# Patient Record
Sex: MALE | Race: WHITE | NOT HISPANIC OR LATINO | Employment: FULL TIME | URBAN - METROPOLITAN AREA
[De-identification: names, ages, dates, MRNs, and addresses within clinical notes are randomized per-mention and may not be internally consistent; named-entity substitution may affect disease eponyms.]

---

## 2020-01-30 ENCOUNTER — OFFICE VISIT (OUTPATIENT)
Dept: FAMILY MEDICINE CLINIC | Facility: CLINIC | Age: 60
End: 2020-01-30

## 2020-01-30 VITALS
WEIGHT: 296 LBS | DIASTOLIC BLOOD PRESSURE: 86 MMHG | RESPIRATION RATE: 16 BRPM | SYSTOLIC BLOOD PRESSURE: 122 MMHG | HEART RATE: 74 BPM | OXYGEN SATURATION: 96 % | HEIGHT: 76 IN | BODY MASS INDEX: 36.04 KG/M2

## 2020-01-30 DIAGNOSIS — Z23 NEED FOR VACCINATION: ICD-10-CM

## 2020-01-30 DIAGNOSIS — E78.5 DYSLIPIDEMIA: ICD-10-CM

## 2020-01-30 DIAGNOSIS — Z00.00 ROUTINE HEALTH MAINTENANCE: Primary | ICD-10-CM

## 2020-01-30 DIAGNOSIS — Z12.11 SCREENING FOR COLON CANCER: ICD-10-CM

## 2020-01-30 DIAGNOSIS — Z13.1 SCREENING FOR DIABETES MELLITUS: ICD-10-CM

## 2020-01-30 DIAGNOSIS — L60.4 BEAU'S LINES OF NAILS: ICD-10-CM

## 2020-01-30 PROBLEM — E66.9 OBESITY (BMI 30-39.9): Status: ACTIVE | Noted: 2017-04-03

## 2020-01-30 PROBLEM — R00.2 PALPITATIONS: Status: ACTIVE | Noted: 2017-04-03

## 2020-01-30 PROBLEM — M51.379 DDD (DEGENERATIVE DISC DISEASE), LUMBOSACRAL: Status: ACTIVE | Noted: 2020-01-30

## 2020-01-30 PROBLEM — S22.49XA MULTIPLE RIB FRACTURES: Status: ACTIVE | Noted: 2017-08-24

## 2020-01-30 PROBLEM — I25.10 CAD (CORONARY ARTERY DISEASE): Status: ACTIVE | Noted: 2017-08-24

## 2020-01-30 PROBLEM — M51.37 DDD (DEGENERATIVE DISC DISEASE), LUMBOSACRAL: Status: ACTIVE | Noted: 2020-01-30

## 2020-01-30 PROBLEM — Z65.8 PSYCHOSOCIAL STRESSORS: Status: ACTIVE | Noted: 2017-03-21

## 2020-01-30 PROCEDURE — 90472 IMMUNIZATION ADMIN EACH ADD: CPT | Performed by: FAMILY MEDICINE

## 2020-01-30 PROCEDURE — 90686 IIV4 VACC NO PRSV 0.5 ML IM: CPT | Performed by: FAMILY MEDICINE

## 2020-01-30 PROCEDURE — 90471 IMMUNIZATION ADMIN: CPT | Performed by: FAMILY MEDICINE

## 2020-01-30 PROCEDURE — 90750 HZV VACC RECOMBINANT IM: CPT | Performed by: FAMILY MEDICINE

## 2020-01-30 PROCEDURE — 99386 PREV VISIT NEW AGE 40-64: CPT | Performed by: FAMILY MEDICINE

## 2020-01-30 NOTE — PROGRESS NOTES
Han Stiles is a 59 y.o. male. Pt is a new pt for the clinic and he is here to establish new PCP and have his nail check.     Chief Complaint   Patient presents with   • Nail Problem       HPI     Here to establish care. 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 Has been working hard to keep himself healthy. Would like to get off all meds. Had high cholesterol in the past, taking low dose atorvastatin. Planning to start a vegan diet beginning of February, will stop statin and would like to check labs sometime thereafter.     Weight has fluctuated over the years. Hoping that a new diet will help him keep the weight off and be more healthy. Hasn't had routine labs in some time. Due for flu shot, hasn't had shingles vaccine.     Only concern is for some horizontal ridges on his fingernails. Can't recall how long he's had them, was told by a friend that they might be due to vitamin deficiency. Doesn't recall any trauma specific to his nails though pretty sure he's broken all of his fingers at one time or another.     Needs colonoscopy. No family or personal history, no current bowel problems.     No family history of prostate issues.     The following portions of the patient's history were reviewed and updated as appropriate: allergies, current medications, past family history, past medical history, past social history, past surgical history and problem list.    Review of Systems   Constitutional: Negative for chills, fatigue, fever and unexpected weight change.   HENT: Negative for sore throat.    Respiratory: Negative for cough, shortness of breath and wheezing.    Cardiovascular: Negative for chest pain, palpitations and leg swelling.   Gastrointestinal: Negative for abdominal distention, abdominal pain, constipation, diarrhea, nausea and vomiting.    Genitourinary: Negative for dysuria.   Musculoskeletal: Negative for arthralgias and myalgias.   Skin: Negative for rash.   Neurological: Negative for dizziness.   Psychiatric/Behavioral: Negative for confusion.     I have reviewed and confirmed the ROS as documented by the MA. Varinder Knott MD    Objective  Vitals:    01/30/20 1612   BP: 122/86   Pulse: 74   Resp: 16   SpO2: 96%     Body mass index is 36.03 kg/m².    Physical Exam   Constitutional: He is oriented to person, place, and time. He appears well-developed and well-nourished. No distress.   Eyes: Pupils are equal, round, and reactive to light. EOM are normal.   Neck: Normal range of motion. Neck supple.   Cardiovascular: Normal rate, regular rhythm, normal heart sounds and intact distal pulses.   No murmur heard.  Pulmonary/Chest: Effort normal and breath sounds normal. No respiratory distress. He has no wheezes.   Abdominal: Soft. Bowel sounds are normal. There is no tenderness. There is no rebound and no guarding.   Musculoskeletal: Normal range of motion.   Neurological: He is alert and oriented to person, place, and time.   Skin: Skin is warm and dry.   Deep lateral ridges on most fingernails, some longitudinal ridging and slight hyperpigmentation   Psychiatric: He has a normal mood and affect. His behavior is normal.   Nursing note and vitals reviewed.        Current Outpatient Medications:   •  atorvastatin (LIPITOR) 20 MG tablet, , Disp: , Rfl:   Current outpatient and discharge medications have been reconciled for the patient.  Reviewed by: Varinder Knott MD      Procedures    Lab Results (most recent)     None                  Han was seen today for nail problem.    Diagnoses and all orders for this visit:    Routine health maintenance    Dyslipidemia  -     Comprehensive Metabolic Panel    Need for vaccination  -     Fluarix/Fluzone/Afluria/FluLaval ([id number])  -     Shingrix Vaccine    Screening for diabetes mellitus  -      Comprehensive Metabolic Panel    Screening for colon cancer  -     Ambulatory Referral For Screening Colonoscopy    Beau's lines of nails    Orders as above. I'll contact him with results. Encouraged to sign up for MyChart. I'll investigate further etiologies of Beau's lines and we'll consider further w/u.     Any information loaded into the AVS was placed there by REN Knott MD, and patient was counseled on the same.   Return in about 6 months (around 7/30/2020), or if symptoms worsen or fail to improve.      REN Knott MD    Prevention counseling performed as below:  Core strengthening for injury prevention.

## 2020-01-31 PROBLEM — L60.4 BEAU'S LINES OF NAILS: Status: ACTIVE | Noted: 2020-01-31

## 2020-01-31 LAB
ALBUMIN SERPL-MCNC: 4.6 G/DL (ref 3.8–4.9)
ALBUMIN/GLOB SERPL: 1.9 {RATIO} (ref 1.2–2.2)
ALP SERPL-CCNC: 53 IU/L (ref 39–117)
ALT SERPL-CCNC: 33 IU/L (ref 0–44)
AST SERPL-CCNC: 18 IU/L (ref 0–40)
BILIRUB SERPL-MCNC: 0.4 MG/DL (ref 0–1.2)
BUN SERPL-MCNC: 17 MG/DL (ref 6–24)
BUN/CREAT SERPL: 18 (ref 9–20)
CALCIUM SERPL-MCNC: 9.3 MG/DL (ref 8.7–10.2)
CHLORIDE SERPL-SCNC: 104 MMOL/L (ref 96–106)
CO2 SERPL-SCNC: 23 MMOL/L (ref 20–29)
CREAT SERPL-MCNC: 0.94 MG/DL (ref 0.76–1.27)
GLOBULIN SER CALC-MCNC: 2.4 G/DL (ref 1.5–4.5)
GLUCOSE SERPL-MCNC: 78 MG/DL (ref 65–99)
POTASSIUM SERPL-SCNC: 4.2 MMOL/L (ref 3.5–5.2)
PROT SERPL-MCNC: 7 G/DL (ref 6–8.5)
SODIUM SERPL-SCNC: 143 MMOL/L (ref 134–144)

## 2020-01-31 NOTE — PATIENT INSTRUCTIONS
Core Strength Exercises    Core exercises help to build strength in the muscles between your ribs and your hips (abdominal muscles). These muscles help to support your body and keep your spine stable. It is important to maintain strength in your core to prevent injury and pain.  Some activities, such as yoga and Pilates, can help to strengthen core muscles. You can also strengthen core muscles with exercises at home. It is important to talk to your health care provider before you start a new exercise routine.  What are the benefits of core strength exercises?  Core strength exercises can:  · Reduce back pain.  · Help to rebuild strength after a back or spine injury.  · Help to prevent injury during physical activity, especially injuries to the back and knees.  How to do core strength exercises  Repeat these exercises 10-15 times, or until you are tired. Do exercises exactly as told by your health care provider and adjust them as directed. It is normal to feel mild stretching, pulling, tightness, or discomfort as you do these exercises. If you feel any pain while doing these exercises, stop. If your pain continues or gets worse when doing core exercises, contact your health care provider.  You may want to use a padded yoga or exercise mat for strength exercises that are done on the floor.  Bridging    1. Lie on your back on a firm surface with your knees bent and your feet flat on the floor.  2. Raise your hips so that your knees, hips, and shoulders form a straight line together. Keep your abdominal muscles tight.  3. Hold this position for 3-5 seconds.  4. Slowly lower your hips to the starting position.  5. Let your muscles relax completely between repetitions.  Single-leg bridge  1. Lie on your back on a firm surface with your knees bent and your feet flat on the floor.  2. Raise your hips so that your knees, hips, and shoulders form a straight line together. Keep your abdominal muscles tight.  3. Lift one foot  off the floor, then completely straighten that leg.  4. Hold this position for 3-5 seconds.  5. Put the straight leg back down in the bent position.  6. Slowly lower your hips to the starting position.  7. Repeat these steps using your other leg.  Side bridge  1. Lie on your side with your knees bent. Prop yourself up on the elbow that is near the floor.  2. Using your abdominal muscles and your elbow that is on the floor, raise your body off the floor. Raise your hip so that your shoulder, hip, and foot form a straight line together.  3. Hold this position for 10 seconds. Keep your head and neck raised and away from your shoulder (in their normal, neutral position). Keep your abdominal muscles tight.  4. Slowly lower your hip to the starting position.  5. Repeat and try to hold this position longer, working your way up to 30 seconds.  Abdominal crunch  1. Lie on your back on a firm surface. Bend your knees and keep your feet flat on the floor.  2. Cross your arms over your chest.  3. Without bending your neck, tip your chin slightly toward your chest.  4. Tighten your abdominal muscles as you lift your chest just high enough to lift your shoulder blades off of the floor. Do not hold your breath. You can do this with short lifts or long lifts.  5. Slowly return to the starting position.  Bird dog  1. Get on your hands and knees, with your legs shoulder-width apart and your arms under your shoulders. Keep your back straight.  2. Tighten your abdominal muscles.  3. Raise one of your legs off the floor and straighten it. Try to keep it parallel to the floor.  4. Slowly lower your leg to the starting position.  5. Raise one of your arms off the floor and straighten it. Try to keep it parallel to the floor.  6. Slowly lower your arm to the starting position.  7. Repeat with the other arm and leg. If possible, try raising a leg and arm at the same time, on opposite sides of the body. For example, raise your left hand and  your right leg.  Plank  1. Lie on your belly.  2. Prop up your body onto your forearms and your feet, keeping your legs straight. Your body should make a straight line between your shoulders and feet.  3. Hold this position for 10 seconds while keeping your abdominal muscles tight.  4. Lower your body to the starting position.  5. Repeat and try to hold this position longer, working your way up to 30 seconds.  Cross-core strengthening  1. Stand with your feet shoulder-width apart.  2. Hold a ball out in front of you. Keep your arms straight.  3. Tighten your abdominal muscles and slowly rotate at your waist from side to side. Keep your feet flat.  4. Once you are comfortable, try repeating this exercise with a heavier ball.  Top core strengthening  1. Stand about 18 inches (46 cm) in front of a wall, with your back to the wall.  2. Keep your feet flat and shoulder-width apart.  3. Tighten your abdominal muscles.  4. Bend your hips and knees.  5. Slowly reach between your legs to touch the wall behind you.  6. Slowly stand back up.  7. Raise your arms over your head and reach behind you.  8. Return to the starting position.  General tips  · Do not do any exercises that cause pain. If you have pain while exercising, talk to your health care provider.  · Always stretch before and after doing these exercises. This can help prevent injury.  · Maintain a healthy weight. Ask your health care provider what weight is healthy for you.  Contact a health care provider if:  · You have back pain that gets worse or does not go away.  · You feel pain while doing core strength exercises.  Get help right away if:  · You have severe pain that does not get better with medicine.  Summary  · Core exercises help to build strength in the muscles between your ribs and your waist.  · Core muscles help to support your body and keep your spine stable.  · Some activities, such as yoga and Pilates, can help to strengthen core muscles.  · Core  strength exercises can help back pain and can prevent injury.  · If you feel any pain while doing core strength exercises, stop.  This information is not intended to replace advice given to you by your health care provider. Make sure you discuss any questions you have with your health care provider.  Document Released: 05/09/2018 Document Revised: 05/09/2018 Document Reviewed: 05/09/2018  [email or web address] Interactive Patient Education © 2019 [email or web address]

## 2020-02-05 ENCOUNTER — PREP FOR SURGERY (OUTPATIENT)
Dept: OTHER | Facility: HOSPITAL | Age: 60
End: 2020-02-05

## 2020-02-05 DIAGNOSIS — Z86.010 HX OF ADENOMATOUS COLONIC POLYPS: Primary | ICD-10-CM

## 2020-12-04 ENCOUNTER — FLU SHOT (OUTPATIENT)
Dept: FAMILY MEDICINE CLINIC | Facility: CLINIC | Age: 60
End: 2020-12-04

## 2020-12-04 DIAGNOSIS — Z23 NEED FOR IMMUNIZATION AGAINST INFLUENZA: Primary | ICD-10-CM

## 2020-12-04 PROCEDURE — 90471 IMMUNIZATION ADMIN: CPT | Performed by: FAMILY MEDICINE

## 2020-12-04 PROCEDURE — 90686 IIV4 VACC NO PRSV 0.5 ML IM: CPT | Performed by: FAMILY MEDICINE

## 2021-01-07 ENCOUNTER — OFFICE VISIT (OUTPATIENT)
Dept: FAMILY MEDICINE CLINIC | Facility: CLINIC | Age: 61
End: 2021-01-07

## 2021-01-07 VITALS
SYSTOLIC BLOOD PRESSURE: 120 MMHG | RESPIRATION RATE: 16 BRPM | WEIGHT: 296 LBS | TEMPERATURE: 96.6 F | HEART RATE: 91 BPM | OXYGEN SATURATION: 98 % | DIASTOLIC BLOOD PRESSURE: 76 MMHG | BODY MASS INDEX: 36.03 KG/M2

## 2021-01-07 DIAGNOSIS — E78.5 DYSLIPIDEMIA: ICD-10-CM

## 2021-01-07 DIAGNOSIS — Z11.59 ENCOUNTER FOR HEPATITIS C SCREENING TEST FOR LOW RISK PATIENT: ICD-10-CM

## 2021-01-07 DIAGNOSIS — Z12.11 SCREENING FOR COLON CANCER: ICD-10-CM

## 2021-01-07 DIAGNOSIS — H69.82 EUSTACHIAN TUBE DYSFUNCTION, LEFT: ICD-10-CM

## 2021-01-07 DIAGNOSIS — Z00.00 ROUTINE HEALTH MAINTENANCE: Primary | ICD-10-CM

## 2021-01-07 DIAGNOSIS — E66.09 CLASS 2 OBESITY DUE TO EXCESS CALORIES WITHOUT SERIOUS COMORBIDITY WITH BODY MASS INDEX (BMI) OF 36.0 TO 36.9 IN ADULT: ICD-10-CM

## 2021-01-07 PROBLEM — E66.812 CLASS 2 OBESITY DUE TO EXCESS CALORIES WITHOUT SERIOUS COMORBIDITY WITH BODY MASS INDEX (BMI) OF 36.0 TO 36.9 IN ADULT: Status: ACTIVE | Noted: 2017-04-03

## 2021-01-07 PROCEDURE — 99396 PREV VISIT EST AGE 40-64: CPT | Performed by: FAMILY MEDICINE

## 2021-01-07 NOTE — PROGRESS NOTES
Chief Complaint  Earache and Annual Exam    Subjective    History of Present Illness {CC  Problem List  Visit  Diagnosis   Encounters  Notes  Medications  Labs  Result Review Imaging  Media :23}     Han Stiles presents to [facility] for Earache and Annual Exam.  History of Present Illness   Here today for general check up and routine physical. Overall feeling good, no major concerns.     Has noticed some dullness in his L ear, though it was previously affecting both sides. Feels as if he's underwater at times. Has problem with chronic sinus congestion and is s/p extensive sinus surgery. Hasn't tried a nasal decongestant recently. No pain in the ear, no drainage.     Trying to maintain a stable weight, though admits that he's gained during the pandemic. Hasn't been as active as he'd like as he's been working from home. Plans to start a new diet and exercise regimen in the near future. Stopped atorvastatin previously due to side effects. Due for a check of labs today.     Has good social and familial support. Job is going well. Going to the dentist as able. Wears his seatbelt. Never able to get his colonoscopy scheduled due to COVID. Interested in Cologuard. UTD in regards to vaccines.     Objective     Vital Signs:   /76   Pulse 91   Temp 96.6 °F (35.9 °C)   Resp 16   Wt 134 kg (296 lb)   SpO2 98%   BMI 36.03 kg/m²   Physical Exam  Vitals signs and nursing note reviewed.   Constitutional:       General: He is not in acute distress.     Appearance: Normal appearance. He is well-developed. He is not ill-appearing.   HENT:      Right Ear: Tympanic membrane, ear canal and external ear normal.      Left Ear: Ear canal and external ear normal. Tympanic membrane is retracted.      Ears:      Comments: Small mucoid effusion behind L TM     Nose: Nose normal.      Mouth/Throat:      Mouth: Mucous membranes are moist.      Pharynx: Oropharynx is clear.   Eyes:       Extraocular Movements: Extraocular movements intact.      Conjunctiva/sclera: Conjunctivae normal.      Pupils: Pupils are equal, round, and reactive to light.   Neck:      Musculoskeletal: Normal range of motion and neck supple.   Cardiovascular:      Rate and Rhythm: Normal rate and regular rhythm.      Pulses: Normal pulses.      Heart sounds: Normal heart sounds. No murmur.   Pulmonary:      Effort: Pulmonary effort is normal. No respiratory distress.      Breath sounds: Normal breath sounds. No wheezing.   Abdominal:      General: Bowel sounds are normal. There is no distension.      Palpations: Abdomen is soft.      Tenderness: There is no abdominal tenderness. There is no guarding or rebound.   Musculoskeletal: Normal range of motion.         General: No tenderness.   Skin:     General: Skin is warm and dry.   Neurological:      General: No focal deficit present.      Mental Status: He is alert and oriented to person, place, and time.      Sensory: No sensory deficit.   Psychiatric:         Mood and Affect: Mood normal.         Behavior: Behavior normal.          Result Review  Data Reviewed:{ Labs  Result Review  Imaging  Med Tab  Media :23}                   Assessment and Plan {CC Problem List  Visit Diagnosis  ROS  Review (Popup)  Health Maintenance  Quality  BestPractice  Medications  SmartSets  SnapShot Encounters  Media :23}   Diagnoses and all orders for this visit:    1. Routine health maintenance (Primary)    2. Eustachian tube dysfunction, left    3. Dyslipidemia  -     Lipid Panel With LDL / HDL Ratio    4. Class 2 obesity due to excess calories without serious comorbidity with body mass index (BMI) of 36.0 to 36.9 in adult  -     Comprehensive Metabolic Panel    5. Encounter for hepatitis C screening test for low risk patient  -     Hepatitis C Antibody    6. Screening for colon cancer  -     Cologuard - Stool, Per Rectum; Future    Orders as above. I will contact him with lab  results as available.     Recommended nasal decongestant to help with what sounds like Eustachian tube dysfunction. He'll let me know if his symptoms improve.     Follow up as below. Encouraged to communicate via [email or web address] in the meantime.       Follow Up {Instructions Charge Capture  Follow-up Communications :23}     Patient was given instructions and counseling regarding his condition or for health maintenance advice. Please see specific information pulled into the AVS (placed there by myself) if appropriate.    Return in about 6 months (around 7/7/2021), or if symptoms worsen or fail to improve.      REN Knott MD    Prevention counseling performed as below:  Mindfulness for stress management.

## 2021-01-08 LAB
ALBUMIN SERPL-MCNC: 4.8 G/DL (ref 3.8–4.9)
ALBUMIN/GLOB SERPL: 1.8 {RATIO} (ref 1.2–2.2)
ALP SERPL-CCNC: 53 IU/L (ref 39–117)
ALT SERPL-CCNC: 34 IU/L (ref 0–44)
AST SERPL-CCNC: 22 IU/L (ref 0–40)
BILIRUB SERPL-MCNC: 0.9 MG/DL (ref 0–1.2)
BUN SERPL-MCNC: 19 MG/DL (ref 6–24)
BUN/CREAT SERPL: 17 (ref 9–20)
CALCIUM SERPL-MCNC: 9.7 MG/DL (ref 8.7–10.2)
CHLORIDE SERPL-SCNC: 102 MMOL/L (ref 96–106)
CHOLEST SERPL-MCNC: 268 MG/DL (ref 100–199)
CO2 SERPL-SCNC: 23 MMOL/L (ref 20–29)
CREAT SERPL-MCNC: 1.11 MG/DL (ref 0.76–1.27)
GLOBULIN SER CALC-MCNC: 2.6 G/DL (ref 1.5–4.5)
GLUCOSE SERPL-MCNC: 80 MG/DL (ref 65–99)
HCV AB S/CO SERPL IA: <0.1 S/CO RATIO (ref 0–0.9)
HDLC SERPL-MCNC: 45 MG/DL
LDLC SERPL CALC-MCNC: 190 MG/DL (ref 0–99)
LDLC/HDLC SERPL: 4.2 RATIO (ref 0–3.6)
POTASSIUM SERPL-SCNC: 4.2 MMOL/L (ref 3.5–5.2)
PROT SERPL-MCNC: 7.4 G/DL (ref 6–8.5)
SODIUM SERPL-SCNC: 139 MMOL/L (ref 134–144)
TRIGL SERPL-MCNC: 176 MG/DL (ref 0–149)
VLDLC SERPL CALC-MCNC: 33 MG/DL (ref 5–40)

## 2021-01-14 ENCOUNTER — TELEPHONE (OUTPATIENT)
Dept: GASTROENTEROLOGY | Facility: CLINIC | Age: 61
End: 2021-01-14

## 2021-01-14 DIAGNOSIS — Z12.11 SCREENING FOR COLON CANCER: Primary | ICD-10-CM

## 2021-01-26 ENCOUNTER — TELEPHONE (OUTPATIENT)
Dept: GASTROENTEROLOGY | Facility: CLINIC | Age: 61
End: 2021-01-26

## 2021-01-26 NOTE — TELEPHONE ENCOUNTER
Last scope 07/21/2014-- personal hx of polyps-- no family hx of polyps-- no ASA or blood thinners-- not currently taking any medication.     OA form and last scope scanned into media

## 2021-01-29 DIAGNOSIS — K63.5 POLYP OF COLON, UNSPECIFIED PART OF COLON, UNSPECIFIED TYPE: Primary | ICD-10-CM

## 2021-02-01 PROBLEM — Z86.0101 HX OF ADENOMATOUS COLONIC POLYPS: Status: ACTIVE | Noted: 2021-02-01

## 2021-02-01 PROBLEM — Z86.010 HX OF ADENOMATOUS COLONIC POLYPS: Status: ACTIVE | Noted: 2021-02-01

## 2021-02-24 ENCOUNTER — TRANSCRIBE ORDERS (OUTPATIENT)
Dept: SLEEP MEDICINE | Facility: HOSPITAL | Age: 61
End: 2021-02-24

## 2021-02-24 DIAGNOSIS — Z01.818 OTHER SPECIFIED PRE-OPERATIVE EXAMINATION: Primary | ICD-10-CM

## 2021-03-02 ENCOUNTER — LAB (OUTPATIENT)
Dept: LAB | Facility: HOSPITAL | Age: 61
End: 2021-03-02

## 2021-03-02 DIAGNOSIS — Z01.818 OTHER SPECIFIED PRE-OPERATIVE EXAMINATION: ICD-10-CM

## 2021-03-02 PROCEDURE — U0004 COV-19 TEST NON-CDC HGH THRU: HCPCS

## 2021-03-02 PROCEDURE — C9803 HOPD COVID-19 SPEC COLLECT: HCPCS

## 2021-03-03 LAB — SARS-COV-2 RNA RESP QL NAA+PROBE: NOT DETECTED

## 2021-03-03 RX ORDER — OMEPRAZOLE 40 MG/1
40 CAPSULE, DELAYED RELEASE ORAL AS NEEDED
COMMUNITY

## 2021-03-04 ENCOUNTER — ANESTHESIA EVENT (OUTPATIENT)
Dept: GASTROENTEROLOGY | Facility: HOSPITAL | Age: 61
End: 2021-03-04

## 2021-03-04 ENCOUNTER — HOSPITAL ENCOUNTER (OUTPATIENT)
Facility: HOSPITAL | Age: 61
Setting detail: HOSPITAL OUTPATIENT SURGERY
Discharge: HOME | End: 2021-03-04
Attending: INTERNAL MEDICINE | Admitting: INTERNAL MEDICINE

## 2021-03-04 ENCOUNTER — ANESTHESIA (OUTPATIENT)
Dept: GASTROENTEROLOGY | Facility: HOSPITAL | Age: 61
End: 2021-03-04

## 2021-03-04 VITALS
DIASTOLIC BLOOD PRESSURE: 80 MMHG | RESPIRATION RATE: 16 BRPM | WEIGHT: 291 LBS | BODY MASS INDEX: 35.44 KG/M2 | OXYGEN SATURATION: 95 % | HEIGHT: 76 IN | HEART RATE: 68 BPM | SYSTOLIC BLOOD PRESSURE: 114 MMHG

## 2021-03-04 PROCEDURE — 25010000002 PROPOFOL 10 MG/ML EMULSION: Performed by: ANESTHESIOLOGY

## 2021-03-04 PROCEDURE — S0260 H&P FOR SURGERY: HCPCS | Performed by: INTERNAL MEDICINE

## 2021-03-04 PROCEDURE — 45378 DIAGNOSTIC COLONOSCOPY: CPT | Performed by: INTERNAL MEDICINE

## 2021-03-04 RX ORDER — PROPOFOL 10 MG/ML
VIAL (ML) INTRAVENOUS AS NEEDED
Status: DISCONTINUED | OUTPATIENT
Start: 2021-03-04 | End: 2021-03-04 | Stop reason: SURG

## 2021-03-04 RX ORDER — LIDOCAINE HYDROCHLORIDE 20 MG/ML
INJECTION, SOLUTION INFILTRATION; PERINEURAL AS NEEDED
Status: DISCONTINUED | OUTPATIENT
Start: 2021-03-04 | End: 2021-03-04 | Stop reason: SURG

## 2021-03-04 RX ORDER — PROPOFOL 10 MG/ML
VIAL (ML) INTRAVENOUS CONTINUOUS PRN
Status: DISCONTINUED | OUTPATIENT
Start: 2021-03-04 | End: 2021-03-04 | Stop reason: SURG

## 2021-03-04 RX ORDER — SODIUM CHLORIDE, SODIUM LACTATE, POTASSIUM CHLORIDE, CALCIUM CHLORIDE 600; 310; 30; 20 MG/100ML; MG/100ML; MG/100ML; MG/100ML
INJECTION, SOLUTION INTRAVENOUS CONTINUOUS PRN
Status: DISCONTINUED | OUTPATIENT
Start: 2021-03-04 | End: 2021-03-04 | Stop reason: SURG

## 2021-03-04 RX ORDER — SODIUM CHLORIDE, SODIUM LACTATE, POTASSIUM CHLORIDE, CALCIUM CHLORIDE 600; 310; 30; 20 MG/100ML; MG/100ML; MG/100ML; MG/100ML
30 INJECTION, SOLUTION INTRAVENOUS CONTINUOUS PRN
Status: DISCONTINUED | OUTPATIENT
Start: 2021-03-04 | End: 2021-03-04 | Stop reason: HOSPADM

## 2021-03-04 RX ADMIN — SODIUM CHLORIDE, POTASSIUM CHLORIDE, SODIUM LACTATE AND CALCIUM CHLORIDE 30 ML/HR: 600; 310; 30; 20 INJECTION, SOLUTION INTRAVENOUS at 10:47

## 2021-03-04 RX ADMIN — PROPOFOL 200 MCG/KG/MIN: 10 INJECTION, EMULSION INTRAVENOUS at 10:58

## 2021-03-04 RX ADMIN — SODIUM CHLORIDE, POTASSIUM CHLORIDE, SODIUM LACTATE AND CALCIUM CHLORIDE: 600; 310; 30; 20 INJECTION, SOLUTION INTRAVENOUS at 10:50

## 2021-03-04 RX ADMIN — LIDOCAINE HYDROCHLORIDE 60 MG: 20 INJECTION, SOLUTION INFILTRATION; PERINEURAL at 10:54

## 2021-03-04 RX ADMIN — PROPOFOL 150 MG: 10 INJECTION, EMULSION INTRAVENOUS at 10:56

## 2021-03-04 RX ADMIN — PROPOFOL 50 MG: 10 INJECTION, EMULSION INTRAVENOUS at 10:58

## 2021-03-04 NOTE — H&P
"[facility]  Pre Procedure History & Physical    Chief Complaint:   History of polyps    Subjective     HPI:   This 60-year-old male presents the endoscopy suite for colonoscopic evaluation.  He has a history of colon polyps.  Last colonoscopy performed in 2014.    Past Medical History:   Past Medical History:   Diagnosis Date   • Coronary artery disease    • DDD (degenerative disc disease), lumbar    • GERD (gastroesophageal reflux disease)    • History of colon polyps    • Hyperlipidemia    • Multiple rib fractures     H/O       Past Surgical History:  Past Surgical History:   Procedure Laterality Date   • ANKLE SURGERY     • BACK SURGERY      X2   • COLONOSCOPY  2014   • ORIF WRIST FRACTURE Bilateral        Family History:  Family History   Problem Relation Age of Onset   • Dementia Mother    • Heart disease Mother    • Cancer Father    • Heart attack Paternal Grandfather    • Colon cancer Neg Hx    • Prostate cancer Neg Hx    • Malig Hyperthermia Neg Hx        Social History:   reports that he has never smoked. He has never used smokeless tobacco. He reports current alcohol use of about 4.0 standard drinks of alcohol per week. Drug use questions deferred to the physician.    Medications:   No medications prior to admission.       Allergies:  Patient has no known allergies.    ROS:    Pertinent items are noted in HPI, all other systems reviewed and negative     Objective     Height 193 cm (75.98\"), weight 134 kg (296 lb).    Physical Exam   Constitutional: Pt is oriented to person, place, and time and well-developed, well-nourished, and in no distress.   Mouth/Throat: Oropharynx is clear and moist.   Neck: Normal range of motion.   Cardiovascular: Normal rate, regular rhythm and normal heart sounds.    Pulmonary/Chest: Effort normal and breath sounds normal.   Abdominal: Soft. Nontender  Skin: Skin is warm and dry.   Psychiatric: Mood, memory, affect and judgment normal. "     Assessment/Plan     Diagnosis:  Polyps    Anticipated Surgical Procedure:  Colonoscopy    The risks, benefits, and alternatives of this procedure have been discussed with the patient or the responsible party- the patient understands and agrees to proceed.

## 2021-03-04 NOTE — ANESTHESIA POSTPROCEDURE EVALUATION
"Patient: Han Stiles    Procedure Summary     Date: 03/04/21 Room / Location:  [facility] /  [facility]    Anesthesia Start: 1050 Anesthesia Stop: 1120    Procedure: COLONOSCOPY TO CECUM AND TERMINAL ILEUM (N/A ) Diagnosis:       Hx of adenomatous colonic polyps      (Hx of adenomatous colonic polyps [Z86.010])    Surgeon: Varinder Huerta MD Provider: Zahira Cervantes MD    Anesthesia Type: MAC ASA Status: 2          Anesthesia Type: MAC    Vitals  No vitals data found for the desired time range.          Post Anesthesia Care and Evaluation    Patient location during evaluation: bedside  Patient participation: complete - patient participated  Level of consciousness: awake  Pain management: adequate  Airway patency: patent  Anesthetic complications: No anesthetic complications    Cardiovascular status: acceptable  Respiratory status: acceptable  Hydration status: acceptable    Comments: /82 (BP Location: Left arm, Patient Position: Lying)   Pulse 70   Resp 18   Ht 193 cm (76\")   Wt 132 kg (291 lb)   SpO2 97%   BMI 35.42 kg/m²       "

## 2021-03-04 NOTE — ANESTHESIA PREPROCEDURE EVALUATION
Anesthesia Evaluation     Patient summary reviewed and Nursing notes reviewed   no history of anesthetic complications:  NPO Solid Status: > 8 hours  NPO Liquid Status: > 8 hours           Airway   Mallampati: II  TM distance: >3 FB  Neck ROM: full  No difficulty expected  Dental - normal exam     Pulmonary - normal exam   (-) COPD, asthma, shortness of breath, sleep apnea, not a smoker  Cardiovascular - normal exam  Exercise tolerance: good (4-7 METS)    (+) hyperlipidemia,   (-) pacemaker, hypertension, valvular problems/murmurs, past MI, dysrhythmias, angina, CHF, FOY, cardiac stents, CABG, PVD, DVT      Neuro/Psych  (-) seizures, TIA, CVA, weakness, numbness, dementia  GI/Hepatic/Renal/Endo    (+) obesity,  GERD well controlled,    (-) morbid obesity, liver disease, no renal disease, diabetes, no thyroid disorder    Musculoskeletal     (-) back pain, neck pain, neck stiffness, chronic pain  Abdominal  - normal exam   Substance History   (-) alcohol use, drug use     OB/GYN    (-)  Pregnant        Other   arthritis,      (-) blood dyscrasia, history of cancer, autoimmune disease, chronic steroid use                  Anesthesia Plan    ASA 2     MAC     intravenous induction     Anesthetic plan, all risks, benefits, and alternatives have been provided, discussed and informed consent has been obtained with: patient.

## 2021-03-11 ENCOUNTER — TELEMEDICINE (OUTPATIENT)
Dept: FAMILY MEDICINE CLINIC | Facility: CLINIC | Age: 61
End: 2021-03-11

## 2021-03-11 DIAGNOSIS — R53.83 FATIGUE, UNSPECIFIED TYPE: Primary | ICD-10-CM

## 2021-03-11 PROCEDURE — 99213 OFFICE O/P EST LOW 20 MIN: CPT | Performed by: FAMILY MEDICINE

## 2021-03-11 NOTE — PROGRESS NOTES
VIDEO VISIT    Chief Complaint  Fatigue    Subjective    History of Present Illness {CC  Problem List  Visit  Diagnosis   Encounters  Notes  Medications  Labs  Result Review Imaging  Media :23}     Han Stiles presents to [facility] for Fatigue.  History of Present Illness     Visit today to discuss possible testosterone supplementation.  Has noticed that he is more fatigued and sore after working out than he is used to.  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  He is wondering if this might be appropriate for him.  Is never had his testosterone checked in the past.    Is eating good nutrition prior to and after working out.  Stretches a little bit before and after as well.  Otherwise is not really engaging in any specific recovery activities.  Staying well-hydrated and avoiding alcohol.    Objective     Vital Signs:   There were no vitals taken for this visit.  Physical Exam  Constitutional:       General: He is not in acute distress.     Appearance: Normal appearance. He is not ill-appearing.   HENT:      Head: Normocephalic.      Nose: Nose normal.   Eyes:      Extraocular Movements: Extraocular movements intact.      Conjunctiva/sclera: Conjunctivae normal.   Pulmonary:      Effort: Pulmonary effort is normal. No respiratory distress.   Musculoskeletal:      Cervical back: Normal range of motion and neck supple.   Skin:     Coloration: Skin is not jaundiced or pale.   Neurological:      Mental Status: He is alert and oriented to person, place, and time.   Psychiatric:         Mood and Affect: Mood normal.         Behavior: Behavior normal.          Result Review  Data Reviewed:{ Labs  Result Review  Imaging  Med Tab  Media :23}                   Assessment and Plan {CC Problem List  Visit Diagnosis  ROS  Review (Popup)  Health Maintenance  Quality  BestPractice   Medications  SmartSets  SnapShot Encounters  Media :23}   Diagnoses and all orders for this visit:    1. Fatigue, unspecified type (Primary)  -     Testosterone (Free & Total), LC / MS; Future  -     CBC (No Diff); Future    Orders as above.  He can have them drawn at [facility] at his convenience and I will contact him with results.    Recommended follow-up as below.  Encouraged communication via [employer] in the meantime.      Follow Up {Instructions Charge Capture  Follow-up Communications :23}     Patient was given instructions and counseling regarding his condition or for health maintenance advice. Please see specific information pulled into the AVS (placed there by myself) if appropriate.    Return in about 1 month (around 4/11/2021), or if symptoms worsen or fail to improve.      REN Knott MD

## 2021-07-08 ENCOUNTER — OFFICE VISIT (OUTPATIENT)
Dept: FAMILY MEDICINE CLINIC | Facility: CLINIC | Age: 61
End: 2021-07-08

## 2021-07-08 VITALS
DIASTOLIC BLOOD PRESSURE: 82 MMHG | RESPIRATION RATE: 16 BRPM | WEIGHT: 298.8 LBS | OXYGEN SATURATION: 96 % | HEART RATE: 76 BPM | BODY MASS INDEX: 36.38 KG/M2 | HEIGHT: 76 IN | SYSTOLIC BLOOD PRESSURE: 116 MMHG

## 2021-07-08 DIAGNOSIS — R53.83 FATIGUE, UNSPECIFIED TYPE: Primary | ICD-10-CM

## 2021-07-08 DIAGNOSIS — E78.5 DYSLIPIDEMIA: ICD-10-CM

## 2021-07-08 LAB
ERYTHROCYTE [DISTWIDTH] IN BLOOD BY AUTOMATED COUNT: 12.8 % (ref 12.3–15.4)
HCT VFR BLD AUTO: 46.5 % (ref 37.5–51)
HGB BLD-MCNC: 15.4 G/DL (ref 13–17.7)
MCH RBC QN AUTO: 29.9 PG (ref 26.6–33)
MCHC RBC AUTO-ENTMCNC: 33.1 G/DL (ref 31.5–35.7)
MCV RBC AUTO: 90.3 FL (ref 79–97)
PLATELET # BLD AUTO: 226 10*3/MM3 (ref 140–450)
RBC # BLD AUTO: 5.15 10*6/MM3 (ref 4.14–5.8)
WBC # BLD AUTO: 5.65 10*3/MM3 (ref 3.4–10.8)

## 2021-07-08 PROCEDURE — 99213 OFFICE O/P EST LOW 20 MIN: CPT | Performed by: FAMILY MEDICINE

## 2021-07-08 RX ORDER — OMEPRAZOLE 20 MG/1
20 CAPSULE, DELAYED RELEASE ORAL
COMMUNITY

## 2021-07-08 NOTE — PROGRESS NOTES
"Chief Complaint  Fatigue    Subjective    History of Present Illness {CC  Problem List  Visit  Diagnosis   Encounters  Notes  Medications  Labs  Result Review Imaging  Media :23}     Han Stiles presents to [facility] for Fatigue.  History of Present Illness     Here today for general follow-up.  Doing fairly well overall.  Continues to experience some occasional fatigue.  Did not get labs done after our last visit.  Was concerned about possible low testosterone.  Has been exercising regularly, most commonly lifting weights.  Is put on a fair amount of muscle and feels that his body composition is changed, however is frustrated by the lack of weight loss.  Has become more interested in mountain biking and road riding.  Interested in possibly competing.    Would like some routine blood work today.  Has a history of dyslipidemia which she has been trying to manage via diet.  Believes that he is eating all the right things, admits that his portions might be slightly off.    Objective     Vital Signs:   /82   Pulse 76   Resp 16   Ht 193 cm (76\")   Wt 136 kg (298 lb 12.8 oz)   SpO2 96%   BMI 36.37 kg/m²   Physical Exam  Vitals and nursing note reviewed.   Constitutional:       General: He is not in acute distress.     Appearance: Normal appearance. He is not ill-appearing.   Cardiovascular:      Rate and Rhythm: Normal rate and regular rhythm.      Pulses: Normal pulses.      Heart sounds: Normal heart sounds. No murmur heard.     Pulmonary:      Effort: Pulmonary effort is normal. No respiratory distress.      Breath sounds: Normal breath sounds. No rales.   Neurological:      Mental Status: He is alert and oriented to person, place, and time. Mental status is at baseline.   Psychiatric:         Mood and Affect: Mood normal.         Behavior: Behavior normal.          Result Review  Data Reviewed:{ Labs  Result Review  Imaging  Med Tab  Media :23}                 "   Assessment and Plan {CC Problem List  Visit Diagnosis  ROS  Review (Popup)  Health Maintenance  Quality  BestPractice  Medications  SmartSets  SnapShot Encounters  Media :23}   Diagnoses and all orders for this visit:    1. Fatigue, unspecified type (Primary)  -     CBC (No Diff)  -     Testosterone    2. Dyslipidemia  -     Lipid Panel  -     Comprehensive Metabolic Panel    Orders as above.  I will contact him with results as available.    We had a long discussion about his overall goals going forward.  Suggested that if he indeed wants to race bikes that he might focus more on biking and less on weightlifting.  I would anticipate some weight loss in this though he might also lose some muscle mass in the process.  He will give this further consideration.  We talked about setting reasonable goals and expectations.    Recommended follow-up as below.  Encouraged communication via [employer] the meantime.      Follow Up {Instructions Charge Capture  Follow-up Communications :23}     Patient was given instructions and counseling regarding his condition or for health maintenance advice. Please see specific information pulled into the AVS (placed there by myself) if appropriate.    Return in about 6 months (around 1/8/2022) for Preventive Health Maintenance.      REN Knott MD

## 2021-07-09 LAB
ALBUMIN SERPL-MCNC: 4.4 G/DL (ref 3.5–5.2)
ALBUMIN/GLOB SERPL: 1.7 G/DL
ALP SERPL-CCNC: 35 U/L (ref 39–117)
ALT SERPL-CCNC: 23 U/L (ref 1–41)
AST SERPL-CCNC: 18 U/L (ref 1–40)
BILIRUB SERPL-MCNC: 0.3 MG/DL (ref 0–1.2)
BUN SERPL-MCNC: 10 MG/DL (ref 6–20)
BUN/CREAT SERPL: 9.9 (ref 7–25)
CALCIUM SERPL-MCNC: 9.6 MG/DL (ref 8.6–10.5)
CHLORIDE SERPL-SCNC: 103 MMOL/L (ref 98–107)
CHOLEST SERPL-MCNC: 215 MG/DL (ref 0–200)
CO2 SERPL-SCNC: 25.9 MMOL/L (ref 22–29)
CREAT SERPL-MCNC: 1.01 MG/DL (ref 0.76–1.27)
GLOBULIN SER CALC-MCNC: 2.6 GM/DL
GLUCOSE SERPL-MCNC: 83 MG/DL (ref 65–99)
HDLC SERPL-MCNC: 28 MG/DL (ref 40–60)
LDLC SERPL CALC-MCNC: 164 MG/DL (ref 0–100)
POTASSIUM SERPL-SCNC: 4.6 MMOL/L (ref 3.5–5.2)
PROT SERPL-MCNC: 7 G/DL (ref 6–8.5)
SODIUM SERPL-SCNC: 138 MMOL/L (ref 136–145)
TESTOST SERPL-MCNC: 35 NG/DL (ref 264–916)
TRIGL SERPL-MCNC: 127 MG/DL (ref 0–150)
VLDLC SERPL CALC-MCNC: 23 MG/DL (ref 5–40)

## 2021-07-10 DIAGNOSIS — R79.89 LOW TESTOSTERONE: Primary | ICD-10-CM

## 2022-01-05 ENCOUNTER — TELEPHONE (OUTPATIENT)
Dept: INFUSION THERAPY | Facility: HOSPITAL | Age: 62
End: 2022-01-05

## 2022-01-05 NOTE — TELEPHONE ENCOUNTER
Patient is feeling better. Had a rapid test and can't get in to get a PCR until 1/7/22. Does not want to get the infusion at this time.

## 2025-03-03 ENCOUNTER — TRANSCRIBE ORDERS (OUTPATIENT)
Dept: ADMINISTRATIVE | Facility: HOSPITAL | Age: 65
End: 2025-03-03
Payer: COMMERCIAL

## 2025-03-03 DIAGNOSIS — R42 DIZZINESS: ICD-10-CM

## 2025-03-03 DIAGNOSIS — R29.6 FALLS: ICD-10-CM

## 2025-03-03 DIAGNOSIS — R55 SYNCOPE AND COLLAPSE: Primary | ICD-10-CM

## 2025-03-03 DIAGNOSIS — R55 SYNCOPE, NEAR: Primary | ICD-10-CM

## 2025-03-11 ENCOUNTER — HOSPITAL ENCOUNTER (OUTPATIENT)
Dept: CARDIOLOGY | Facility: HOSPITAL | Age: 65
Discharge: HOME | End: 2025-03-11
Admitting: FAMILY MEDICINE
Payer: COMMERCIAL

## 2025-03-11 DIAGNOSIS — R42 DIZZINESS: ICD-10-CM

## 2025-03-11 DIAGNOSIS — R55 SYNCOPE AND COLLAPSE: ICD-10-CM

## 2025-03-11 DIAGNOSIS — R29.6 FALLS: ICD-10-CM

## 2025-03-11 PROCEDURE — 93306 TTE W/DOPPLER COMPLETE: CPT

## 2025-03-11 PROCEDURE — 93356 MYOCRD STRAIN IMG SPCKL TRCK: CPT

## 2025-03-15 LAB
AORTIC DIMENSIONLESS INDEX: 0.87 (DI)
AV MEAN PRESS GRAD SYS DOP V1V2: 1 MMHG
AV VMAX SYS DOP: 84.1 CM/SEC
BH CV ECHO LEFT VENTRICLE GLOBAL LONGITUDINAL STRAIN: -12.4 %
BH CV ECHO MEAS - ACS: 2.6 CM
BH CV ECHO MEAS - AO MAX PG: 2.8 MMHG
BH CV ECHO MEAS - AO V2 VTI: 16.1 CM
BH CV ECHO MEAS - AVA(I,D): 3.9 CM2
BH CV ECHO MEAS - EDV(CUBED): 175.6 ML
BH CV ECHO MEAS - EDV(MOD-SP4): 183 ML
BH CV ECHO MEAS - EF(MOD-SP4): 55.5 %
BH CV ECHO MEAS - ESV(CUBED): 50.7 ML
BH CV ECHO MEAS - ESV(MOD-SP4): 81.5 ML
BH CV ECHO MEAS - FS: 33.9 %
BH CV ECHO MEAS - IVS/LVPW: 1 CM
BH CV ECHO MEAS - IVSD: 1.1 CM
BH CV ECHO MEAS - LA DIMENSION: 5.7 CM
BH CV ECHO MEAS - LAT PEAK E' VEL: 6.5 CM/SEC
BH CV ECHO MEAS - LV DIASTOLIC VOL/BSA (35-75): 69.7 CM2
BH CV ECHO MEAS - LV MASS(C)D: 249.3 GRAMS
BH CV ECHO MEAS - LV MAX PG: 2.14 MMHG
BH CV ECHO MEAS - LV MEAN PG: 1 MMHG
BH CV ECHO MEAS - LV SYSTOLIC VOL/BSA (12-30): 31.1 CM2
BH CV ECHO MEAS - LV V1 MAX: 73.2 CM/SEC
BH CV ECHO MEAS - LV V1 VTI: 14 CM
BH CV ECHO MEAS - LVIDD: 5.6 CM
BH CV ECHO MEAS - LVIDS: 3.7 CM
BH CV ECHO MEAS - LVOT AREA: 4.5 CM2
BH CV ECHO MEAS - LVOT DIAM: 2.4 CM
BH CV ECHO MEAS - LVPWD: 1.1 CM
BH CV ECHO MEAS - MED PEAK E' VEL: 6.5 CM/SEC
BH CV ECHO MEAS - MR MAX PG: 16.2 MMHG
BH CV ECHO MEAS - MR MAX VEL: 201 CM/SEC
BH CV ECHO MEAS - MV A MAX VEL: 64.1 CM/SEC
BH CV ECHO MEAS - MV DEC SLOPE: 243 CM/SEC2
BH CV ECHO MEAS - MV DEC TIME: 0.22 SEC
BH CV ECHO MEAS - MV E MAX VEL: 50.2 CM/SEC
BH CV ECHO MEAS - MV E/A: 0.78
BH CV ECHO MEAS - MV MAX PG: 1.57 MMHG
BH CV ECHO MEAS - MV MEAN PG: 1 MMHG
BH CV ECHO MEAS - MV P1/2T: 66.2 MSEC
BH CV ECHO MEAS - MV V2 VTI: 14.2 CM
BH CV ECHO MEAS - MVA(P1/2T): 3.3 CM2
BH CV ECHO MEAS - MVA(VTI): 4.5 CM2
BH CV ECHO MEAS - PA ACC TIME: 0.16 SEC
BH CV ECHO MEAS - PA V2 MAX: 107 CM/SEC
BH CV ECHO MEAS - RAP SYSTOLE: 8 MMHG
BH CV ECHO MEAS - RV MAX PG: 1.13 MMHG
BH CV ECHO MEAS - RV V1 MAX: 53.1 CM/SEC
BH CV ECHO MEAS - RV V1 VTI: 10.6 CM
BH CV ECHO MEAS - RVDD: 3.9 CM
BH CV ECHO MEAS - SV(LVOT): 63.3 ML
BH CV ECHO MEAS - SV(MOD-SP4): 101.5 ML
BH CV ECHO MEAS - SVI(LVOT): 24.1 ML/M2
BH CV ECHO MEAS - SVI(MOD-SP4): 38.7 ML/M2
BH CV ECHO MEAS - TAPSE (>1.6): 2.2 CM
BH CV ECHO MEASUREMENTS AVERAGE E/E' RATIO: 7.72
BH CV XLRA - TDI S': 12.4 CM/SEC
LEFT ATRIUM VOLUME INDEX: 29.9 ML/M2
LV EF 2D ECHO EST: 55 %
SINUS: 3.6 CM
STJ: 3.2 CM

## 2025-03-17 ENCOUNTER — HOSPITAL ENCOUNTER (OUTPATIENT)
Dept: RESPIRATORY THERAPY | Facility: HOSPITAL | Age: 65
Discharge: HOME | End: 2025-03-17
Admitting: FAMILY MEDICINE
Payer: COMMERCIAL

## 2025-03-17 DIAGNOSIS — R42 DIZZINESS: ICD-10-CM

## 2025-03-17 DIAGNOSIS — R29.6 FALLS: ICD-10-CM

## 2025-03-17 DIAGNOSIS — R55 SYNCOPE, NEAR: ICD-10-CM

## 2025-03-17 PROCEDURE — 93242 EXT ECG>48HR<7D RECORDING: CPT

## 2025-03-24 LAB
CV ZIO BASELINE AVG BPM: 86
CV ZIO BASELINE BPM HIGH: 185
CV ZIO BASELINE BPM LOW: 61

## (undated) DEVICE — ADAPT CLN BIOGUARD AIR/H2O DISP

## (undated) DEVICE — TUBING, SUCTION, 1/4" X 10', STRAIGHT: Brand: MEDLINE

## (undated) DEVICE — SENSR O2 OXIMAX FNGR A/ 18IN NONSTR

## (undated) DEVICE — KT ORCA ORCAPOD DISP STRL

## (undated) DEVICE — CANN O2 ETCO2 FITS ALL CONN CO2 SMPL A/ 7IN DISP LF

## (undated) DEVICE — LN SMPL CO2 SHTRM SD STREAM W/M LUER

## (undated) DEVICE — THE TORRENT IRRIGATION SCOPE CONNECTOR IS USED WITH THE TORRENT IRRIGATION TUBING TO PROVIDE IRRIGATION FLUIDS SUCH AS STERILE WATER DURING GASTROINTESTINAL ENDOSCOPIC PROCEDURES WHEN USED IN CONJUNCTION WITH AN IRRIGATION PUMP (OR ELECTROSURGICAL UNIT).: Brand: TORRENT